# Patient Record
Sex: MALE | Race: WHITE
[De-identification: names, ages, dates, MRNs, and addresses within clinical notes are randomized per-mention and may not be internally consistent; named-entity substitution may affect disease eponyms.]

---

## 2018-02-28 ENCOUNTER — HOSPITAL ENCOUNTER (EMERGENCY)
Dept: HOSPITAL 17 - PHED | Age: 83
Discharge: HOME | End: 2018-02-28
Payer: MEDICARE

## 2018-02-28 VITALS
DIASTOLIC BLOOD PRESSURE: 78 MMHG | HEART RATE: 108 BPM | OXYGEN SATURATION: 95 % | SYSTOLIC BLOOD PRESSURE: 152 MMHG | RESPIRATION RATE: 20 BRPM

## 2018-02-28 VITALS
HEART RATE: 76 BPM | DIASTOLIC BLOOD PRESSURE: 62 MMHG | OXYGEN SATURATION: 94 % | SYSTOLIC BLOOD PRESSURE: 188 MMHG | RESPIRATION RATE: 20 BRPM

## 2018-02-28 DIAGNOSIS — Z86.718: ICD-10-CM
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
## 2018-02-28 DIAGNOSIS — I10: ICD-10-CM

## 2018-02-28 DIAGNOSIS — G20: ICD-10-CM

## 2018-02-28 DIAGNOSIS — Z23: ICD-10-CM

## 2018-02-28 DIAGNOSIS — S01.81XA: Primary | ICD-10-CM

## 2018-02-28 DIAGNOSIS — Z79.01: ICD-10-CM

## 2018-02-28 DIAGNOSIS — J45.909: ICD-10-CM

## 2018-02-28 DIAGNOSIS — Z79.899: ICD-10-CM

## 2018-02-28 PROCEDURE — L0150 CERV SEMI-RIG ADJ MOLDED CHN: HCPCS

## 2018-02-28 PROCEDURE — 90471 IMMUNIZATION ADMIN: CPT

## 2018-02-28 PROCEDURE — 72125 CT NECK SPINE W/O DYE: CPT

## 2018-02-28 PROCEDURE — 90714 TD VACC NO PRESV 7 YRS+ IM: CPT

## 2018-02-28 PROCEDURE — 99283 EMERGENCY DEPT VISIT LOW MDM: CPT

## 2018-02-28 PROCEDURE — 70450 CT HEAD/BRAIN W/O DYE: CPT

## 2018-02-28 PROCEDURE — 12013 RPR F/E/E/N/L/M 2.6-5.0 CM: CPT
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
## 2018-02-28 NOTE — PD
Physical Exam


Date Seen by Provider:  Feb 28, 2018


Time Seen by Provider:  19:50





Data


Data


Last Documented VS





Vital Signs








  Date Time  Temp Pulse Resp B/P (MAP) Pulse Ox O2 Delivery O2 Flow Rate FiO2


 


2/28/18 16:17  76 20 188/62 (104) 94   








Orders





 Orders


Ct Brain W/O Iv Contrast(Rout) (2/28/18 16:23)


Ct Cerv Spine W/O Contrast (2/28/18 16:23)


Tetanus/Diphtheria Tox Adult (Tetanus/Di (2/28/18 16:30)


Lidocai-Epi 1%-1:100,000 Inj (Xylocaine- (2/28/18 18:00)


Lidocai-Epi 1%-1:100,000 Inj (Xylocaine- (2/28/18 18:07)


Apply Cervical Collar (2/28/18 18:08)


Ed Discharge Order (2/28/18 20:06)








MDM


Medical Record Reviewed:  Yes


Supervised Visit with ANA MARIA:  No


Procedures


**Procedure Narrative**


LACERATION


LOCATION: Right forehead


LENGTH: 5 cm


NUMBER OF STITCHES/STAPLES: 9 simple interrupted





REPAIR: The area of the laceration was prepped with Betadine and sterilely 

draped.  The laceration was infiltrated with 1% lidocaine.  The wound was 

copiously irrigated and explored without evidence of foreign body, tendon 

injury or neurovascular injury.  The wound was closed using 5-0 Prolene. This 

was a single layer repair. A sterile dressing was applied. The patient was 

advised to keep the dressing clean and dry. Patient tolerated the procedure 

well.





Diagnosis





 Primary Impression:  


 Facial laceration


 Qualified Codes:  S01.81XA - Laceration without foreign body of other part of 

head, initial encounter


 Additional Impression:  


 Head injury


 Qualified Codes:  S09.90XA - Unspecified injury of head, initial encounter


Referrals:  


Primary Care Physician


call for appointment


Patient Instructions:  General Instructions, Care For Your Stitches (ED), 

Laceration (ED), Head Injury (DC)


Departure Forms:  Tests/Procedures





***Additional Instruction:  


Keep wound clean and dry.  Apply antibiotic ointment starting tomorrow.  

Sutures need to be removed by primary physician in 5 days.  Follow-up with 

primary physician within 2 days for close recheck.  As we discussed, the CT of 

the cervical spine/neck was abnormal.  Use soft collar as directed. Alexey may 

require further imaging or treatment as an outpatient.  Return with worsening 

symptoms.  Hold Eliquis tonight.  Restart tomorrow.


Disposition:  01 DISCHARGE HOME


Condition:  Stable











Jaylin Mcrae Feb 28, 2018 20:21

## 2018-02-28 NOTE — RADRPT
EXAM DATE/TIME:  02/28/2018 17:31 

 

[facility] COMPARISON:     

CT CERVICAL SPINE W/O CONTRAST, July 19, 2014, 12:38.

 

 

INDICATIONS :     

***Trauma, fall.

                      

 

RADIATION DOSE:     

25.64 CTDIvol (mGy) 

 

 

 

MEDICAL HISTORY :     

Deep venous thrombosis. Hypertension. Parkinsons.

 

SURGICAL HISTORY :      

None. 

 

ENCOUNTER:      

Initial

 

ACUITY:      

1 day

 

PAIN SCALE:      

Non-responsive

 

LOCATION:        

neck 

 

TECHNIQUE:     

Volumetric scanning of the cervical spine was performed. Multiplanar reconstructions in the sagittal,
 coronal and oblique axial planes were performed.   Using automated exposure control and adjustment o
f the mA and/or kV according to patient size, radiation dose was kept as low as reasonably achievable
 to obtain optimal diagnostic quality images.   DICOM format image data is available electronically f
or review and comparison.  

 

FINDINGS:     

Examination quality is degraded by motion artifact.

There is a slight asymmetric widening of the anterior disc space at C2-C3 and with an endplate osteop
hyte anteriorly being  from the anterior inferior vertebral body which represents a change f
rom the prior study. The bilateral C2-T3 facet joints also mildly widened and this also represents a 
change from the prior study. There is mild adjacent prevertebral soft tissue swelling. The bones are 
severely undermineralized. The atlantoaxial relationship is within normal limits. Otherwise, no fract
ure is identified. Degenerative disc disease is present at C5-C6 and C6-C7.

 

The visualized portions of the posterior fossa, paraspinous soft tissues, and upper lung zones demons
trate no acute abnormality.

 

CONCLUSION:     

Severely under mineralized bones with findings suspicious for injury across the C2-C3 disc space with
 subtle asymmetric widening of the anterior disc space and subtle widening of the facet joints bilate
rally. This represents a change from the prior examination. There is also mild adjacent soft tissue s
welling. This finding could be further evaluated to confirm an acute process with MRI, if clinically 
needed.

 

 

 

 Jono Villalba MD on February 28, 2018 at 17:46           

Board Certified Radiologist.

 This report was verified electronically.

## 2018-02-28 NOTE — RADRPT
EXAM DATE/TIME:  02/28/2018 17:31 

 

[facility] COMPARISON:     

CT BRAIN W/O CONTRAST, July 19, 2014, 12:38.

 

 

INDICATIONS :     

Trauma, fall.

                      

 

RADIATION DOSE:     

63.22 CTDIvol (mGy) 

 

 

 

MEDICAL HISTORY :     

Deep venous thrombosis. Parkinsons. Hypertension.

 

SURGICAL HISTORY :      

None. 

 

ENCOUNTER:      

Initial

 

ACUITY:      

1 day

 

PAIN SCALE:      

Non-responsive

 

LOCATION:        

cranial 

 

TECHNIQUE:     

Multiple contiguous axial images were obtained of the head.  Using automated exposure control and adj
ustment of the mA and/or kV according to patient size, radiation dose was kept as low as reasonably a
chievable to obtain optimal diagnostic quality images.   DICOM format image data is available electro
nically for review and comparison.  

 

FINDINGS:     

There is mild symmetric ventricular prominence. No evidence of abnormal extra-axial fluid accumulatio
n, mass or hemorrhage. Patchy diminished attenuation in periventricular white matter appears benign. 
There is nothing to suggest acute infarction. There is scalp swelling in the right frontal region. No
 evidence of underlying fracture. The extracranial structures are grossly benign and intact.

 

CONCLUSION:     

No acute intracranial findings

 

 

 

 Jono Phillips MD on February 28, 2018 at 17:41           

Board Certified Radiologist.

 This report was verified electronically.